# Patient Record
Sex: FEMALE | Race: WHITE | NOT HISPANIC OR LATINO | Employment: OTHER | URBAN - METROPOLITAN AREA
[De-identification: names, ages, dates, MRNs, and addresses within clinical notes are randomized per-mention and may not be internally consistent; named-entity substitution may affect disease eponyms.]

---

## 2017-03-28 ENCOUNTER — PREPPED CHART (OUTPATIENT)
Dept: URBAN - METROPOLITAN AREA CLINIC 43 | Facility: CLINIC | Age: 72
End: 2017-03-28

## 2018-07-09 ENCOUNTER — ESTABLISHED COMPREHENSIVE EXAM (OUTPATIENT)
Dept: URBAN - METROPOLITAN AREA CLINIC 43 | Facility: CLINIC | Age: 73
End: 2018-07-09

## 2018-07-09 DIAGNOSIS — H52.13: ICD-10-CM

## 2018-07-09 DIAGNOSIS — Z96.1: ICD-10-CM

## 2018-07-09 DIAGNOSIS — H52.4: ICD-10-CM

## 2018-07-09 DIAGNOSIS — H52.203: ICD-10-CM

## 2018-07-09 DIAGNOSIS — H04.123: ICD-10-CM

## 2018-07-09 PROCEDURE — 92015 DETERMINE REFRACTIVE STATE: CPT

## 2018-07-09 PROCEDURE — 92014 COMPRE OPH EXAM EST PT 1/>: CPT

## 2018-07-09 ASSESSMENT — VISUAL ACUITY
OS_PH: 20/25-2
OD_SC: 20/40
OS_CC: J4
OD_PH: 20/30-1
OD_SC: J3
OS_SC: 20/30-3
OS_SC: J8
OD_CC: J1

## 2018-07-09 ASSESSMENT — TONOMETRY
OD_IOP_MMHG: 13
OS_IOP_MMHG: 14

## 2019-07-10 ENCOUNTER — ESTABLISHED COMPREHENSIVE EXAM (OUTPATIENT)
Dept: URBAN - METROPOLITAN AREA CLINIC 43 | Facility: CLINIC | Age: 74
End: 2019-07-10

## 2019-07-10 DIAGNOSIS — Z96.1: ICD-10-CM

## 2019-07-10 DIAGNOSIS — H04.123: ICD-10-CM

## 2019-07-10 PROCEDURE — 92015 DETERMINE REFRACTIVE STATE: CPT

## 2019-07-10 PROCEDURE — 92014 COMPRE OPH EXAM EST PT 1/>: CPT

## 2019-07-10 ASSESSMENT — VISUAL ACUITY
OS_CC: J1
OD_SC: 20/60-2
OS_SC: 20/30+1
OD_CC: J1

## 2019-07-10 ASSESSMENT — TONOMETRY
OD_IOP_MMHG: 17
OS_IOP_MMHG: 15

## 2020-10-01 ENCOUNTER — ESTABLISHED COMPREHENSIVE EXAM (OUTPATIENT)
Dept: URBAN - METROPOLITAN AREA CLINIC 43 | Facility: CLINIC | Age: 75
End: 2020-10-01

## 2020-10-01 DIAGNOSIS — H52.4: ICD-10-CM

## 2020-10-01 DIAGNOSIS — Z96.1: ICD-10-CM

## 2020-10-01 DIAGNOSIS — H52.203: ICD-10-CM

## 2020-10-01 DIAGNOSIS — H04.123: ICD-10-CM

## 2020-10-01 DIAGNOSIS — H52.13: ICD-10-CM

## 2020-10-01 PROCEDURE — 92015 DETERMINE REFRACTIVE STATE: CPT

## 2020-10-01 PROCEDURE — 92014 COMPRE OPH EXAM EST PT 1/>: CPT

## 2020-10-01 ASSESSMENT — TONOMETRY
OD_IOP_MMHG: 15
OS_IOP_MMHG: 15

## 2020-10-01 ASSESSMENT — VISUAL ACUITY
OS_SC: J3
OS_SC: 20/30-1+1
OD_SC: J1
OD_SC: 20/50-1

## 2021-10-06 ENCOUNTER — ESTABLISHED COMPREHENSIVE EXAM (OUTPATIENT)
Dept: URBAN - METROPOLITAN AREA CLINIC 43 | Facility: CLINIC | Age: 76
End: 2021-10-06

## 2021-10-06 DIAGNOSIS — Z96.1: ICD-10-CM

## 2021-10-06 DIAGNOSIS — Z98.890: ICD-10-CM

## 2021-10-06 DIAGNOSIS — H04.123: ICD-10-CM

## 2021-10-06 PROCEDURE — 92015 DETERMINE REFRACTIVE STATE: CPT

## 2021-10-06 PROCEDURE — 92014 COMPRE OPH EXAM EST PT 1/>: CPT

## 2021-10-06 ASSESSMENT — VISUAL ACUITY
OS_SC: 20/40-2
OD_SC: 20/60-1
OD_SC: J1
OS_SC: J2

## 2021-10-06 ASSESSMENT — TONOMETRY
OS_IOP_MMHG: 14
OD_IOP_MMHG: 14

## 2022-11-28 ENCOUNTER — COMPREHENSIVE EXAM (OUTPATIENT)
Dept: URBAN - METROPOLITAN AREA CLINIC 43 | Facility: CLINIC | Age: 77
End: 2022-11-28

## 2022-11-28 DIAGNOSIS — H04.123: ICD-10-CM

## 2022-11-28 DIAGNOSIS — Z96.1: ICD-10-CM

## 2022-11-28 DIAGNOSIS — Z98.890: ICD-10-CM

## 2022-11-28 PROCEDURE — 92014 COMPRE OPH EXAM EST PT 1/>: CPT

## 2022-11-28 PROCEDURE — 92015 DETERMINE REFRACTIVE STATE: CPT

## 2022-11-28 ASSESSMENT — VISUAL ACUITY
OS_SC: J2
OS_SC: 20/30
OS_CC: J1
OD_SC: J2
OD_SC: 20/40-1
OD_CC: J1

## 2022-11-28 ASSESSMENT — TONOMETRY
OD_IOP_MMHG: 14
OS_IOP_MMHG: 12

## 2023-12-27 ENCOUNTER — COMPREHENSIVE EXAM (OUTPATIENT)
Dept: URBAN - METROPOLITAN AREA CLINIC 43 | Facility: CLINIC | Age: 78
End: 2023-12-27

## 2023-12-27 DIAGNOSIS — Z96.1: ICD-10-CM

## 2023-12-27 DIAGNOSIS — Z98.890: ICD-10-CM

## 2023-12-27 DIAGNOSIS — H04.123: ICD-10-CM

## 2023-12-27 PROCEDURE — 92015 DETERMINE REFRACTIVE STATE: CPT

## 2023-12-27 PROCEDURE — 92014 COMPRE OPH EXAM EST PT 1/>: CPT

## 2023-12-27 ASSESSMENT — VISUAL ACUITY
OD_SC: J1
OD_SC: 20/50
OS_SC: 20/25-2
OU_SC: 20/25
OD_CC: J1
OU_SC: J1
OS_SC: J2
OS_CC: J1

## 2023-12-27 ASSESSMENT — TONOMETRY
OD_IOP_MMHG: 14
OS_IOP_MMHG: 13

## 2024-12-30 ENCOUNTER — COMPREHENSIVE EXAM (OUTPATIENT)
Age: 79
End: 2024-12-30

## 2024-12-30 DIAGNOSIS — Z96.1: ICD-10-CM

## 2024-12-30 DIAGNOSIS — H04.123: ICD-10-CM

## 2024-12-30 DIAGNOSIS — Z98.890: ICD-10-CM

## 2024-12-30 PROCEDURE — 92015 DETERMINE REFRACTIVE STATE: CPT

## 2024-12-30 PROCEDURE — 92014 COMPRE OPH EXAM EST PT 1/>: CPT

## 2025-03-07 ENCOUNTER — EMERGENCY VISIT (OUTPATIENT)
Age: 80
End: 2025-03-07

## 2025-03-07 DIAGNOSIS — H01.003: ICD-10-CM

## 2025-03-07 DIAGNOSIS — H01.006: ICD-10-CM

## 2025-03-07 DIAGNOSIS — Z96.1: ICD-10-CM

## 2025-03-07 DIAGNOSIS — B88.0: ICD-10-CM

## 2025-03-07 DIAGNOSIS — H04.123: ICD-10-CM

## 2025-03-07 DIAGNOSIS — Z98.890: ICD-10-CM

## 2025-03-07 PROCEDURE — 92012 INTRM OPH EXAM EST PATIENT: CPT

## 2025-03-21 ENCOUNTER — EMERGENCY VISIT (OUTPATIENT)
Age: 80
End: 2025-03-21

## 2025-03-21 DIAGNOSIS — H01.006: ICD-10-CM

## 2025-03-21 DIAGNOSIS — H04.123: ICD-10-CM

## 2025-03-21 DIAGNOSIS — H15.101: ICD-10-CM

## 2025-03-21 DIAGNOSIS — Z96.1: ICD-10-CM

## 2025-03-21 DIAGNOSIS — Z98.890: ICD-10-CM

## 2025-03-21 DIAGNOSIS — H01.003: ICD-10-CM

## 2025-03-21 DIAGNOSIS — B88.0: ICD-10-CM

## 2025-03-21 PROCEDURE — 92012 INTRM OPH EXAM EST PATIENT: CPT

## 2025-03-21 RX ORDER — PREDNISOLONE ACETATE 10 MG/ML: 1 SUSPENSION/ DROPS OPHTHALMIC
